# Patient Record
Sex: FEMALE | Race: WHITE | Employment: FULL TIME | URBAN - METROPOLITAN AREA
[De-identification: names, ages, dates, MRNs, and addresses within clinical notes are randomized per-mention and may not be internally consistent; named-entity substitution may affect disease eponyms.]

---

## 2017-02-21 ENCOUNTER — OFFICE VISIT (OUTPATIENT)
Dept: FAMILY MEDICINE CLINIC | Facility: CLINIC | Age: 47
End: 2017-02-21

## 2017-02-21 VITALS
HEART RATE: 68 BPM | OXYGEN SATURATION: 100 % | SYSTOLIC BLOOD PRESSURE: 118 MMHG | RESPIRATION RATE: 14 BRPM | DIASTOLIC BLOOD PRESSURE: 78 MMHG | TEMPERATURE: 98 F

## 2017-02-21 DIAGNOSIS — R30.9 PAIN WITH URINATION: ICD-10-CM

## 2017-02-21 DIAGNOSIS — N30.01 ACUTE CYSTITIS WITH HEMATURIA: Primary | ICD-10-CM

## 2017-02-21 LAB
MULTISTIX LOT#: ABNORMAL NUMERIC
PH, URINE: 7.9 (ref 4.5–8)
SPECIFIC GRAVITY: 1.01 (ref 1–1.03)
URINE-COLOR: YELLOW
UROBILINOGEN,SEMI-QN: 0.2 MG/DL (ref 0–1.9)

## 2017-02-21 PROCEDURE — 99213 OFFICE O/P EST LOW 20 MIN: CPT | Performed by: PHYSICIAN ASSISTANT

## 2017-02-21 PROCEDURE — 87086 URINE CULTURE/COLONY COUNT: CPT | Performed by: PHYSICIAN ASSISTANT

## 2017-02-21 PROCEDURE — 81003 URINALYSIS AUTO W/O SCOPE: CPT | Performed by: PHYSICIAN ASSISTANT

## 2017-02-21 RX ORDER — NITROFURANTOIN 25; 75 MG/1; MG/1
100 CAPSULE ORAL 2 TIMES DAILY
Qty: 14 CAPSULE | Refills: 0 | Status: SHIPPED | OUTPATIENT
Start: 2017-02-21 | End: 2017-02-28

## 2017-02-21 NOTE — PROGRESS NOTES
CHIEF COMPLAINT:   Patient presents with:  Urinary: buring at end of urination, dull suprapubic pain at night      HPI:   Freya Simmons is a 55year old female who presents with symptoms of possible UTI.  Complaining of pain at the end of urination eac SKIN: no rashes, no skin wounds or ulcers. GI: See HPI. No N/V/C/D. : See HPI. NEURO: no headaches.     EXAM:   /78 mmHg  Pulse 68  Temp(Src) 97.6 °F (36.4 °C)  Resp 14  SpO2 100%  LMP 01/25/2017 (Approximate)  GENERAL: well developed, well nour Bladder Infection, Female (Adult)    Urine is normally doesn't have any bacteria in it. But bacteria can get into the urinary tract from the skin around the rectum. Or they can travel in the blood from elsewhere in the body.  Once they are in your urinary t · Bowel incontinence  · Pregnancy  · Procedures such as having a catheter inserted  · Older age  · Not emptying your bladder. This can allow bacteria a chance to grow in your urine.   · Dehydration  · Constipation  · Sex  · Use of a diaphragm for birth cont · Avoid caffeine, alcohol, and spicy foods. These can irritate your bladder. · Urinate right after intercourse to flush out your bladder. · If you use birth control pills and have frequent bladder infections, discuss it with your doctor.   Follow-up care Urethritis can be caused by a bacterial or viral infection. Such an infection can lead to conditions such as a urinary tract infection (UTI) or sexually transmitted disease (STD).  Urethritis can also be caused by injury or sensitivity or allergy to chemica Treatment depends on the cause of urethritis. If it’s due to a bacterial infection, antibiotics (medications that fight infection) will be given. Your health care provider can tell you more about your treatment options.  In the meantime, your symptoms can b

## 2017-02-21 NOTE — PATIENT INSTRUCTIONS
Bladder Infection, Female (Adult)    Urine is normally doesn't have any bacteria in it. But bacteria can get into the urinary tract from the skin around the rectum. Or they can travel in the blood from elsewhere in the body.  Once they are in your urinary · Bowel incontinence  · Pregnancy  · Procedures such as having a catheter inserted  · Older age  · Not emptying your bladder. This can allow bacteria a chance to grow in your urine.   · Dehydration  · Constipation  · Sex  · Use of a diaphragm for birth cont · Avoid caffeine, alcohol, and spicy foods. These can irritate your bladder. · Urinate right after intercourse to flush out your bladder. · If you use birth control pills and have frequent bladder infections, discuss it with your doctor.   Follow-up care Urethritis can be caused by a bacterial or viral infection. Such an infection can lead to conditions such as a urinary tract infection (UTI) or sexually transmitted disease (STD).  Urethritis can also be caused by injury or sensitivity or allergy to chemica Treatment depends on the cause of urethritis. If it’s due to a bacterial infection, antibiotics (medications that fight infection) will be given. Your health care provider can tell you more about your treatment options.  In the meantime, your symptoms can b

## 2017-04-10 ENCOUNTER — HOSPITAL ENCOUNTER (OUTPATIENT)
Dept: MAMMOGRAPHY | Age: 47
Discharge: HOME | End: 2017-04-10
Attending: OBSTETRICS & GYNECOLOGY
Payer: COMMERCIAL

## 2017-04-10 DIAGNOSIS — Z12.31 VISIT FOR SCREENING MAMMOGRAM: ICD-10-CM

## 2017-04-10 PROCEDURE — 77067 SCR MAMMO BI INCL CAD: CPT

## 2017-04-18 ENCOUNTER — HOSPITAL ENCOUNTER (OUTPATIENT)
Dept: MAMMOGRAPHY | Facility: HOSPITAL | Age: 47
Discharge: HOME | End: 2017-04-18
Attending: OBSTETRICS & GYNECOLOGY
Payer: COMMERCIAL

## 2017-04-18 DIAGNOSIS — R92.2 INCONCLUSIVE MAMMOGRAM: ICD-10-CM

## 2017-04-18 PROCEDURE — 77065 DX MAMMO INCL CAD UNI: CPT

## 2017-04-18 PROCEDURE — 77061 BREAST TOMOSYNTHESIS UNI: CPT

## 2017-04-18 PROCEDURE — 76642 ULTRASOUND BREAST LIMITED: CPT

## 2017-06-01 ENCOUNTER — TELEPHONE (OUTPATIENT)
Dept: FAMILY MEDICINE CLINIC | Facility: CLINIC | Age: 47
End: 2017-06-01

## 2017-06-01 NOTE — TELEPHONE ENCOUNTER
LOV 5/15     Patient was advised by PSR  to go to [facility] as there are no available appointment today. Patient Declined . Says not needed and she would wait for appointment. When I spoke to patient.      Spoke to patient  She is not new says she has had this \"

## 2017-06-15 ENCOUNTER — OFFICE VISIT (OUTPATIENT)
Dept: FAMILY MEDICINE CLINIC | Facility: CLINIC | Age: 47
End: 2017-06-15

## 2017-06-15 VITALS
BODY MASS INDEX: 20.74 KG/M2 | RESPIRATION RATE: 12 BRPM | TEMPERATURE: 99 F | HEIGHT: 63.75 IN | HEART RATE: 68 BPM | SYSTOLIC BLOOD PRESSURE: 132 MMHG | WEIGHT: 120 LBS | DIASTOLIC BLOOD PRESSURE: 70 MMHG

## 2017-06-15 DIAGNOSIS — D17.79 LIPOMA OF OTHER SPECIFIED SITES: Primary | ICD-10-CM

## 2017-06-15 DIAGNOSIS — K42.9 UMBILICAL HERNIA WITHOUT OBSTRUCTION AND WITHOUT GANGRENE: ICD-10-CM

## 2017-06-15 PROCEDURE — 99212 OFFICE O/P EST SF 10 MIN: CPT | Performed by: FAMILY MEDICINE

## 2017-06-15 NOTE — PATIENT INSTRUCTIONS
Refer to Dr. Norberto Enrique for lipoma, and ask about umbilical hernia, that doesn't need surgery if it is not painful. In future, call doctor on call before going to walkin clinic or immediate care for after-hours health care needs.

## 2017-06-15 NOTE — PROGRESS NOTES
Galileo Fairchild IS A 55year old female HERE FOR Patient presents with:  Lump: left leg painful to sit        History of present illness:     Sitting more for  since November at current job, was previously walking more.     Has lump at bottom of Smoker     Smokeless tobacco: Never Used    Alcohol Use: No    Drug Use: No    Sexual Activity: Not on file   Not on file  Other Topics Concern    Exercise Yes     Social History Narrative    Balanced diet,        Review of systems:     No current Paraay

## 2017-06-29 ENCOUNTER — OFFICE VISIT (OUTPATIENT)
Dept: SURGERY | Facility: CLINIC | Age: 47
End: 2017-06-29

## 2017-06-29 VITALS
DIASTOLIC BLOOD PRESSURE: 85 MMHG | BODY MASS INDEX: 20.49 KG/M2 | HEIGHT: 64 IN | WEIGHT: 120 LBS | HEART RATE: 72 BPM | SYSTOLIC BLOOD PRESSURE: 142 MMHG | TEMPERATURE: 98 F | RESPIRATION RATE: 16 BRPM

## 2017-06-29 DIAGNOSIS — D17.1 LIPOMA OF BUTTOCK: Primary | ICD-10-CM

## 2017-06-29 PROCEDURE — 99242 OFF/OP CONSLTJ NEW/EST SF 20: CPT | Performed by: SURGERY

## 2017-06-30 PROBLEM — D17.1 LIPOMA OF BUTTOCK: Status: ACTIVE | Noted: 2017-06-30

## 2017-07-01 NOTE — H&P
New Patient  Kyle Scott  12/7/1970 6/30/2017    This patient was referred by Suzette Forde MD for evaluation and consultation for lipoma. Chief Complaint: lipoma    History of Present Illness:  The patient is a 69-year-old female who noted a (VITAMIN B12) 500 MCG Oral Tab, Take 500 mcg by mouth daily. , Disp: , Rfl:   •  Nutritional Supplements (OSTEO-MULTIVITAMINS/MINERALS OR), Take  by mouth., Disp: , Rfl:   •  Probiotic Product (PROBIOTIC DAILY OR), Take  by mouth., Disp: , Rfl:     No Known female who noted a lipoma of the left buttock.   She states that although this has been there for quite some time it has become increasingly symptomatic over the past several months as her job description has changed and she now spends several hours of the

## 2017-07-06 ENCOUNTER — LABORATORY ENCOUNTER (OUTPATIENT)
Dept: LAB | Age: 47
End: 2017-07-06
Attending: SURGERY
Payer: COMMERCIAL

## 2017-07-06 DIAGNOSIS — D17.1 LIPOMA OF BUTTOCK: Primary | ICD-10-CM

## 2017-07-06 PROCEDURE — 88304 TISSUE EXAM BY PATHOLOGIST: CPT

## 2017-07-14 ENCOUNTER — OFFICE VISIT (OUTPATIENT)
Dept: SURGERY | Facility: CLINIC | Age: 47
End: 2017-07-14

## 2017-07-14 VITALS
HEIGHT: 64 IN | TEMPERATURE: 98 F | WEIGHT: 120 LBS | BODY MASS INDEX: 20.49 KG/M2 | HEART RATE: 72 BPM | RESPIRATION RATE: 14 BRPM | DIASTOLIC BLOOD PRESSURE: 77 MMHG | SYSTOLIC BLOOD PRESSURE: 113 MMHG

## 2017-07-14 DIAGNOSIS — D17.1 LIPOMA OF BUTTOCK: Primary | ICD-10-CM

## 2017-07-14 PROCEDURE — 99024 POSTOP FOLLOW-UP VISIT: CPT | Performed by: SURGERY

## 2017-07-14 NOTE — PROGRESS NOTES
GENERAL SURGERY    Duane Schaffer MD, FACS, Shirley Sarkar. Vicki Dominguez MD, Chaya Garcia MD, FACS  Ender Abdi.  Naresh Astorga MD, Cheryl Shelton MD, KELSY Yoder PA-C

## 2017-08-21 ENCOUNTER — OFFICE VISIT (OUTPATIENT)
Dept: SURGERY | Facility: CLINIC | Age: 47
End: 2017-08-21

## 2017-08-21 VITALS
TEMPERATURE: 98 F | HEART RATE: 73 BPM | DIASTOLIC BLOOD PRESSURE: 89 MMHG | SYSTOLIC BLOOD PRESSURE: 124 MMHG | HEIGHT: 64 IN | WEIGHT: 120 LBS | BODY MASS INDEX: 20.49 KG/M2

## 2017-08-21 DIAGNOSIS — D17.1 LIPOMA OF BUTTOCK: Primary | ICD-10-CM

## 2017-08-21 PROCEDURE — 99024 POSTOP FOLLOW-UP VISIT: CPT | Performed by: PHYSICIAN ASSISTANT

## 2017-08-21 NOTE — PROGRESS NOTES
Post Operative Visit Note       Active Problems  1. Lipoma of buttock         Chief Complaint   Patient presents with:  Post-Op: 7/6 lipoma, approx 2.5 weeks ago incision became reddened with some bloody draiage, no fever or hardness. But increased pain. 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 Psychiatric/Behavioral: Negative for behavioral problems and sleep disturbance.        Physical Findings   /89   Pulse 73   Temp 97.6 °F (36.4 °C)   Ht 64\"   Wt 120 lb   BMI 20.60 kg/m²   Physical Exam   Constitutional There is no limitations on activity. If this does not resolve the patient's pain and symptoms she should contact her office. All questions and concerns were answered and addressed at today's office visit.          No orders of the defined types were place

## 2018-09-25 ENCOUNTER — HOSPITAL ENCOUNTER (OUTPATIENT)
Dept: MAMMOGRAPHY | Age: 48
Discharge: HOME | End: 2018-09-25
Attending: NURSE PRACTITIONER
Payer: COMMERCIAL

## 2018-09-25 DIAGNOSIS — Z12.31 VISIT FOR SCREENING MAMMOGRAM: ICD-10-CM

## 2018-09-25 PROCEDURE — 77067 SCR MAMMO BI INCL CAD: CPT | Performed by: NURSE PRACTITIONER

## 2018-09-25 PROCEDURE — 77063 BREAST TOMOSYNTHESIS BI: CPT | Performed by: NURSE PRACTITIONER

## (undated) NOTE — MR AVS SNAPSHOT
[address] [place] [address] [place]  [phone]               Thank you for choosing us for your health care visit with Dominic Lund PA-C. We are glad to serve you and happy to provide you with this summary of your visit.   P inflammation of the bladder. The most common cause of cystitis is an infection. Symptoms  The infection causes inflammation in the urethra and bladder. This causes many of the symptoms.  The most common symptoms of a bladder infection are:  · Pain or burni unless another medicine was prescribed. If you have chronic liver or kidney disease, talk with your healthcare provider before using these medicines.  Also talk with your provider if you've ever had a stomach ulcer or gastrointestinal bleeding, or are [place] When to seek medical advice  Call your healthcare provider right away if any of these occur:  · Fever of 100.4ºF (38. 0ºC) or higher, or as directed by your healthcare provider  · Symptoms are not better by the third day of treatment  · Back or belly (abdom · Cystoscopy to allow the health care provider to look for problems in the urinary tract. The test uses a thin, flexible telescope called a cystoscope with a light and camera attached. The scope is inserted into the urethra.   · Ultrasound to allow the heal talk to your partner about STDs before you have sex. Date Last Reviewed: 9/8/2014  © 2903-3711 [address], [address] [place]. All rights reserved.  This information is not intended as a substitute for professional me [employer] Lot# [id number] [employer]    [employer] Expiration Date 4/2017 Date                  [facility]     Sign up for [employer], your secure online medical record.   [employer] will allow you to access patient instructions from your recent visit,  view other [employer] i Get your heart pumping – brisk walking, biking, swimming Even 10 minute increments are effective and add up over the week   2 ½ hours per week – spread out over time Use a davon to keep you motivated   Don’t forget strength training with weights and resist

## (undated) NOTE — LETTER
17    Patient: Freya Simmons  : 1970 Visit date: 2017    Dear  Dr. Ayo Munguia MD,    Thank you for referring Freya Simmons to my practice. Please find my assessment and plan below.           Assessment:      The patient is a 46-year-o

## (undated) NOTE — LETTER
GENERAL SURGERY    Duane Schaffer MD, FACS, Shirley Sarkar. Vicki Dominguez MD, Chaya Garcia MD, FACS  Ender Abdi.  Naresh Astorga MD, Cheryl Shelton MD, FACS  KELSY Prieto PA-C

## (undated) NOTE — MR AVS SNAPSHOT
[facility] [address] [place] [id number]  [phone]               Thank you for choosing us for your health care visit with Madiha Smith MD.  We are glad to serve you and happy to provide you with this schedule your appointment. Failure to obtain required authorization numbers can create reimbursement difficulties for you.     Assoc Dx:  Lipoma of other specified sites [X19.77], Umbilical hernia without obstruction and without gangrene [K42.9]          Re Your unique Loopt Access Code: [email or web address]  Expires: 8/14/2017  1:12 PM    If you have questions, you can call [phone] to talk to our [facility] Staff. Remember, Lo[employer] is NOT to be used for urgent needs. For medical emergencies, dial 911.